# Patient Record
Sex: FEMALE | Race: BLACK OR AFRICAN AMERICAN | URBAN - METROPOLITAN AREA
[De-identification: names, ages, dates, MRNs, and addresses within clinical notes are randomized per-mention and may not be internally consistent; named-entity substitution may affect disease eponyms.]

---

## 2023-08-30 PROBLEM — 128302006: Status: ACTIVE | Noted: 2023-08-30

## 2023-09-13 ENCOUNTER — WEB ENCOUNTER (OUTPATIENT)
Dept: URBAN - METROPOLITAN AREA CLINIC 74 | Facility: CLINIC | Age: 33
End: 2023-09-13

## 2023-09-13 ENCOUNTER — OFFICE VISIT (OUTPATIENT)
Dept: URBAN - METROPOLITAN AREA CLINIC 74 | Facility: CLINIC | Age: 33
End: 2023-09-13
Payer: SELF-PAY

## 2023-09-13 ENCOUNTER — LAB OUTSIDE AN ENCOUNTER (OUTPATIENT)
Dept: URBAN - METROPOLITAN AREA CLINIC 74 | Facility: CLINIC | Age: 33
End: 2023-09-13

## 2023-09-13 VITALS
DIASTOLIC BLOOD PRESSURE: 70 MMHG | SYSTOLIC BLOOD PRESSURE: 118 MMHG | HEART RATE: 82 BPM | WEIGHT: 141.4 LBS | BODY MASS INDEX: 22.73 KG/M2 | TEMPERATURE: 97.6 F | HEIGHT: 66 IN

## 2023-09-13 DIAGNOSIS — B18.1 CHRONIC HEPATITIS B: ICD-10-CM

## 2023-09-13 PROBLEM — 61977001: Status: ACTIVE | Noted: 2023-09-13

## 2023-09-13 PROCEDURE — 99203 OFFICE O/P NEW LOW 30 MIN: CPT | Performed by: PHYSICIAN ASSISTANT

## 2023-09-13 RX ORDER — DOXYCYCLINE HYCLATE 50 MG/1
2 CAPSULES CAPSULE, GELATIN COATED ORAL TWICE A DAY
Status: ACTIVE | COMMUNITY

## 2023-09-13 NOTE — HPI-TODAY'S VISIT:
The patient is 32-year-old female with no significant past medical history is presenting to our clinic today for evaluation of Chronic hepatitis B diagnosed since age of 18 year old.  The patient is staing that she had Tattoo when she was 18 and she belives that the source of Hepatitis B transmission. She denies any GI issues today.   -- The patient denies dyspepsia, dysphagia, odynophagia, hemoptysis, hematemesis, nausea, vomiting, regurgitation, melena, constipation, diarrhea, abdominal pain, hematochezia, fever, chills, chest pain, SOB, or any other GI complaints today.  -- The patient denies ETOH, Tobacco, and Illicit drug use.  -- The patient is up to date with COVID vaccine 2/2. -- Denies NSAID's.  Diagnostic studies: -- Labs on labs on 107/10/2023 TSH 1.38.  Vitamin D level 19.  HIV nonreactive.  Hepatitis B surface antigen reactive.  Hepatitis C surface antibody nonreactive.

## 2023-09-19 ENCOUNTER — OFFICE VISIT (OUTPATIENT)
Dept: URBAN - METROPOLITAN AREA CLINIC 73 | Facility: CLINIC | Age: 33
End: 2023-09-19

## 2023-09-19 LAB
A/G RATIO: 1.4
ABSOLUTE BASOPHILS: 40
ABSOLUTE EOSINOPHILS: 29
ABSOLUTE LYMPHOCYTES: 1825
ABSOLUTE MONOCYTES: 263
ABSOLUTE NEUTROPHILS: 1444
AFP, SERUM: 2.1
AFP-L3%, SERUM: (no result)
ALBUMIN: 4.3
ALKALINE PHOSPHATASE: 53
ALT (SGPT): 14
AST (SGOT): 16
BASOPHILS: 1.1
BILIRUBIN, TOTAL: 1.2
BUN/CREATININE RATIO: (no result)
BUN: 10
CALCIUM: 9.3
CARBON DIOXIDE, TOTAL: 25
CHLORIDE: 105
CREATININE: 0.85
EGFR: 94
EOSINOPHILS: 0.8
GLOBULIN, TOTAL: 3.1
GLUCOSE: 70
HBV LOG10: 2.24
HDV IGM: NEGATIVE
HEMATOCRIT: 37.7
HEMOGLOBIN: 11.4
HEP A AB, IGM: (no result)
HEP B CORE AB, IGM: (no result)
HEP BE AB: REACTIVE
HEP BE AG: (no result)
HEPATITIS A AB, TOTAL: REACTIVE
HEPATITIS B CORE AB TOTAL: REACTIVE
HEPATITIS B SURFACE AB IMMUNITY, QN: <5
HEPATITIS B SURFACE ANTIGEN: REACTIVE
HEPATITIS B VIRUS DNA: 175
HEPATITIS B VIRUS DNA: 2.24
HEPATITIS C ANTIBODY: (no result)
HEPATITIS D VIRUS RNA,: NOT DETECTED
HEPATITIS D VIRUS RNA,: NOT DETECTED
LYMPHOCYTES: 50.7
MCH: 22.7
MCHC: 30.2
MCV: 75.1
MONOCYTES: 7.3
MPV: (no result)
NEUTROPHILS: 40.1
PLATELET COUNT: 166
POTASSIUM: 4.1
PROTEIN, TOTAL: 7.4
RDW: 14.2
RED BLOOD CELL COUNT: 5.02
SODIUM: 138
WHITE BLOOD CELL COUNT: 3.6

## 2023-09-20 ENCOUNTER — TELEPHONE ENCOUNTER (OUTPATIENT)
Dept: URBAN - METROPOLITAN AREA CLINIC 74 | Facility: CLINIC | Age: 33
End: 2023-09-20

## 2023-09-28 ENCOUNTER — TELEPHONE ENCOUNTER (OUTPATIENT)
Dept: URBAN - METROPOLITAN AREA CLINIC 74 | Facility: CLINIC | Age: 33
End: 2023-09-28

## 2023-10-02 ENCOUNTER — OFFICE VISIT (OUTPATIENT)
Dept: URBAN - METROPOLITAN AREA CLINIC 74 | Facility: CLINIC | Age: 33
End: 2023-10-02
Payer: SELF-PAY

## 2023-10-02 VITALS
DIASTOLIC BLOOD PRESSURE: 68 MMHG | TEMPERATURE: 98.1 F | SYSTOLIC BLOOD PRESSURE: 118 MMHG | HEART RATE: 85 BPM | BODY MASS INDEX: 23.4 KG/M2 | HEIGHT: 66 IN | WEIGHT: 145.6 LBS

## 2023-10-02 DIAGNOSIS — B18.1 CHRONIC HEPATITIS B: ICD-10-CM

## 2023-10-02 PROCEDURE — 99212 OFFICE O/P EST SF 10 MIN: CPT | Performed by: INTERNAL MEDICINE

## 2023-10-02 RX ORDER — DOXYCYCLINE HYCLATE 50 MG/1
2 CAPSULES CAPSULE, GELATIN COATED ORAL TWICE A DAY
Status: ACTIVE | COMMUNITY

## 2023-10-02 NOTE — HPI-TODAY'S VISIT:
The patient is 32-year-old female with no significant past medical history is presenting to our clinic today to discuss her recent labs results and she has not scheduled her US yet. Suspected Hepatitis B 2/2 tattoo. Parents and her  are Hepatitis B negative. No GI issues today.    -- The patient denies dyspepsia, dysphagia, odynophagia, hemoptysis, hematemesis, nausea, vomiting, regurgitation, melena, constipation, diarrhea, abdominal pain, hematochezia, fever, chills, chest pain, SOB, or any other GI complaints today.  -- The patient denies ETOH, Tobacco, and Illicit drug use.  -- The patient is up to date with COVID vaccine 2/2. -- Denies NSAID's.  Diagnostic studies: -- Labs on 09/13/2023 HAV Ab and HAV Ag negative. HDV Ag and Ab not detected. HCV Ab not detected. HBsAb undetectable. HBeAg negative. HBeAb positive. HBc total reactive. HBc IgM negative. HBs Ag reactive. AFP 2.1. HBV . CMP with normal hepatic function. CBC with Hgb 11.4 and Hct 37.7.   -- Labs on labs on 107/10/2023 TSH 1.38.  Vitamin D level 19.  HIV nonreactive.  Hepatitis B surface antigen reactive.  Hepatitis C surface antibody nonreactive.

## 2023-12-05 ENCOUNTER — OFFICE VISIT (OUTPATIENT)
Dept: URBAN - METROPOLITAN AREA CLINIC 73 | Facility: CLINIC | Age: 33
End: 2023-12-05

## 2024-04-08 ENCOUNTER — OV EP (OUTPATIENT)
Dept: URBAN - METROPOLITAN AREA CLINIC 74 | Facility: CLINIC | Age: 34
End: 2024-04-08

## 2024-04-08 RX ORDER — DOXYCYCLINE HYCLATE 50 MG/1
2 CAPSULES CAPSULE, GELATIN COATED ORAL TWICE A DAY
Status: ACTIVE | COMMUNITY

## 2024-04-08 NOTE — HPI-TODAY'S VISIT:
The patient is 32-year-old female with no significant past medical history is presenting to our clinic today for 6 months follow up. Suspected Hepatitis B 2/2 tattoo. Parents and her  are Hepatitis B negative. No GI issues today. RUQ US not completed.   -- The patient denies dyspepsia, dysphagia, odynophagia, hemoptysis, hematemesis, nausea, vomiting, regurgitation, melena, constipation, diarrhea, abdominal pain, hematochezia, fever, chills, chest pain, SOB, or any other GI complaints today.  -- The patient denies ETOH, Tobacco, and Illicit drug use.  -- The patient is up to date with COVID vaccine 2/2. -- Denies NSAID's.  Diagnostic studies: -- Labs on 09/13/2023 HAV Ab and HAV Ag negative. HDV Ag and Ab not detected. HCV Ab not detected. HBsAb undetectable. HBeAg negative. HBeAb positive. HBc total reactive. HBc IgM negative. HBs Ag reactive. AFP 2.1. HBV . CMP with normal hepatic function. CBC with Hgb 11.4 and Hct 37.7.   -- Labs on labs on 107/10/2023 TSH 1.38.  Vitamin D level 19.  HIV nonreactive.  Hepatitis B surface antigen reactive.  Hepatitis C surface antibody nonreactive.

## 2024-08-01 ENCOUNTER — TELEPHONE ENCOUNTER (OUTPATIENT)
Dept: URBAN - METROPOLITAN AREA CLINIC 74 | Facility: CLINIC | Age: 34
End: 2024-08-01

## 2024-08-14 ENCOUNTER — LAB OUTSIDE AN ENCOUNTER (OUTPATIENT)
Dept: URBAN - METROPOLITAN AREA CLINIC 74 | Facility: CLINIC | Age: 34
End: 2024-08-14

## 2024-08-14 ENCOUNTER — OFFICE VISIT (OUTPATIENT)
Dept: URBAN - METROPOLITAN AREA CLINIC 74 | Facility: CLINIC | Age: 34
End: 2024-08-14
Payer: SELF-PAY

## 2024-08-14 ENCOUNTER — DASHBOARD ENCOUNTERS (OUTPATIENT)
Age: 34
End: 2024-08-14

## 2024-08-14 VITALS
SYSTOLIC BLOOD PRESSURE: 126 MMHG | DIASTOLIC BLOOD PRESSURE: 76 MMHG | TEMPERATURE: 98.2 F | HEART RATE: 62 BPM | HEIGHT: 66 IN | WEIGHT: 137 LBS | BODY MASS INDEX: 22.02 KG/M2

## 2024-08-14 DIAGNOSIS — Z01.84 IMMUNITY TO HEPATITIS A VIRUS DETERMINED BY SEROLOGIC TEST: ICD-10-CM

## 2024-08-14 DIAGNOSIS — B18.1 CHRONIC HEPATITIS B: ICD-10-CM

## 2024-08-14 PROCEDURE — 99214 OFFICE O/P EST MOD 30 MIN: CPT | Performed by: PHYSICIAN ASSISTANT

## 2024-08-14 RX ORDER — DOXYCYCLINE HYCLATE 50 MG/1
2 CAPSULES CAPSULE, GELATIN COATED ORAL TWICE A DAY
Status: ON HOLD | COMMUNITY

## 2024-08-14 NOTE — HPI-TODAY'S VISIT:
The patient is 33-year-old female with no significant past medical history is presenting to our clinic today for 6 months follow up. Suspected Hepatitis B 2/2 tattoo. Parents and her  are Hepatitis B negative. The patient is here requesting a letter to be given for her Fertility clinic to be able to go through IVF. The patient denies any GI issues today.    Diagnostic studies: -- Labs on 09/13/2023 HAV Ab and HAV Ag negative. HDV Ag and Ab not detected. HCV Ab not detected. HBsAb undetectable. HBeAg negative. HBeAb positive. HBc total reactive. HBc IgM negative. HBs Ag reactive. AFP 2.1. HBV . CMP with normal hepatic function. CBC with Hgb 11.4 and Hct 37.7.   -- Labs on labs on 107/10/2023 TSH 1.38.  Vitamin D level 19.  HIV nonreactive.  Hepatitis B surface antigen reactive.  Hepatitis C surface antibody nonreactive.

## 2024-08-16 LAB
A/G RATIO: 1.4
ALBUMIN: 4.5
ALKALINE PHOSPHATASE: 54
ALT (SGPT): 18
AST (SGOT): 22
BILIRUBIN, TOTAL: 1
BUN/CREATININE RATIO: (no result)
BUN: 9
CALCIUM: 9.6
CARBON DIOXIDE, TOTAL: 27
CHLORIDE: 103
CREATININE: 0.93
EGFR: 84
GGT: 16
GLOBULIN, TOTAL: 3.3
GLUCOSE: 75
HBV LOG10: 3
HEP B CORE AB, IGM: (no result)
HEPATITIS B CORE AB TOTAL: REACTIVE
HEPATITIS B SURFACE AB IMMUNITY, QN: <5
HEPATITIS B SURFACE ANTIGEN: REACTIVE
HEPATITIS B VIRUS DNA: 994
POTASSIUM: 4
PROTEIN, TOTAL: 7.8
SODIUM: 138

## 2024-08-17 ENCOUNTER — TELEPHONE ENCOUNTER (OUTPATIENT)
Dept: URBAN - METROPOLITAN AREA CLINIC 74 | Facility: CLINIC | Age: 34
End: 2024-08-17
